# Patient Record
Sex: FEMALE | Race: BLACK OR AFRICAN AMERICAN | NOT HISPANIC OR LATINO
[De-identification: names, ages, dates, MRNs, and addresses within clinical notes are randomized per-mention and may not be internally consistent; named-entity substitution may affect disease eponyms.]

---

## 2019-08-12 PROBLEM — Z00.00 ENCOUNTER FOR PREVENTIVE HEALTH EXAMINATION: Status: ACTIVE | Noted: 2019-08-12

## 2019-09-04 ENCOUNTER — APPOINTMENT (OUTPATIENT)
Dept: BARIATRICS | Facility: CLINIC | Age: 44
End: 2019-09-04
Payer: COMMERCIAL

## 2019-09-04 VITALS
OXYGEN SATURATION: 98 % | BODY MASS INDEX: 37.51 KG/M2 | SYSTOLIC BLOOD PRESSURE: 116 MMHG | WEIGHT: 233.38 LBS | TEMPERATURE: 98.1 F | DIASTOLIC BLOOD PRESSURE: 77 MMHG | HEART RATE: 71 BPM | HEIGHT: 66 IN

## 2019-09-04 DIAGNOSIS — E78.00 PURE HYPERCHOLESTEROLEMIA, UNSPECIFIED: ICD-10-CM

## 2019-09-04 DIAGNOSIS — Z87.891 PERSONAL HISTORY OF NICOTINE DEPENDENCE: ICD-10-CM

## 2019-09-04 DIAGNOSIS — E66.01 MORBID (SEVERE) OBESITY DUE TO EXCESS CALORIES: ICD-10-CM

## 2019-09-04 DIAGNOSIS — Z78.9 OTHER SPECIFIED HEALTH STATUS: ICD-10-CM

## 2019-09-04 DIAGNOSIS — J45.909 UNSPECIFIED ASTHMA, UNCOMPLICATED: ICD-10-CM

## 2019-09-04 DIAGNOSIS — R32 UNSPECIFIED URINARY INCONTINENCE: ICD-10-CM

## 2019-09-04 DIAGNOSIS — Z83.3 FAMILY HISTORY OF DIABETES MELLITUS: ICD-10-CM

## 2019-09-04 DIAGNOSIS — Z82.5 FAMILY HISTORY OF ASTHMA AND OTHER CHRONIC LOWER RESPIRATORY DISEASES: ICD-10-CM

## 2019-09-04 DIAGNOSIS — N39.3 STRESS INCONTINENCE (FEMALE) (MALE): ICD-10-CM

## 2019-09-04 DIAGNOSIS — M54.40 LUMBAGO WITH SCIATICA, UNSPECIFIED SIDE: ICD-10-CM

## 2019-09-04 DIAGNOSIS — M54.5 LOW BACK PAIN: ICD-10-CM

## 2019-09-04 DIAGNOSIS — Z82.49 FAMILY HISTORY OF ISCHEMIC HEART DISEASE AND OTHER DISEASES OF THE CIRCULATORY SYSTEM: ICD-10-CM

## 2019-09-04 DIAGNOSIS — M25.559 PAIN IN UNSPECIFIED HIP: ICD-10-CM

## 2019-09-04 DIAGNOSIS — M25.569 PAIN IN UNSPECIFIED KNEE: ICD-10-CM

## 2019-09-04 PROCEDURE — 99203 OFFICE O/P NEW LOW 30 MIN: CPT

## 2019-09-04 RX ORDER — CETIRIZINE HYDROCHLORIDE 5 MG/1
TABLET ORAL
Refills: 0 | Status: ACTIVE | COMMUNITY

## 2019-09-04 RX ORDER — ALBUTEROL 90 MCG
AEROSOL (GRAM) INHALATION
Refills: 0 | Status: ACTIVE | COMMUNITY

## 2019-09-04 RX ORDER — FLUTICASONE PROPIONATE 50 UG/1
50 SPRAY, METERED NASAL
Refills: 0 | Status: ACTIVE | COMMUNITY

## 2019-09-26 NOTE — ASSESSMENT
[FreeTextEntry1] : She meets the NIH criteria for bariatric surgery and would like to have a laparoscopic sleeve gastrectomy. i have reviewed the risks and benefits of the procedure with the patient, and she understands this information fully.

## 2019-09-26 NOTE — HISTORY OF PRESENT ILLNESS
[de-identified] : Patient is a 44 year old female with along history of morbid obesity not responsive to multiple dietary regimens. She has a BMI of 37.7 and weight-related comorbidities including hypertension, asthma, hypercholesterolemia, stress urinary incontinence and lower back pain.

## 2019-10-02 ENCOUNTER — APPOINTMENT (OUTPATIENT)
Dept: BARIATRICS | Facility: CLINIC | Age: 44
End: 2019-10-02

## 2019-10-16 ENCOUNTER — APPOINTMENT (OUTPATIENT)
Dept: BARIATRICS | Facility: CLINIC | Age: 44
End: 2019-10-16

## 2019-11-20 ENCOUNTER — EMERGENCY (EMERGENCY)
Facility: HOSPITAL | Age: 44
LOS: 1 days | Discharge: HOME | End: 2019-11-20
Attending: EMERGENCY MEDICINE | Admitting: EMERGENCY MEDICINE
Payer: COMMERCIAL

## 2019-11-20 VITALS
SYSTOLIC BLOOD PRESSURE: 127 MMHG | TEMPERATURE: 98 F | WEIGHT: 225.09 LBS | HEART RATE: 65 BPM | RESPIRATION RATE: 16 BRPM | DIASTOLIC BLOOD PRESSURE: 79 MMHG | HEIGHT: 66 IN | OXYGEN SATURATION: 99 %

## 2019-11-20 VITALS
HEART RATE: 88 BPM | TEMPERATURE: 98 F | RESPIRATION RATE: 15 BRPM | SYSTOLIC BLOOD PRESSURE: 110 MMHG | DIASTOLIC BLOOD PRESSURE: 79 MMHG | OXYGEN SATURATION: 100 %

## 2019-11-20 LAB
ALBUMIN SERPL ELPH-MCNC: 4.5 G/DL — SIGNIFICANT CHANGE UP (ref 3.3–5)
ALP SERPL-CCNC: 98 U/L — SIGNIFICANT CHANGE UP (ref 40–120)
ALT FLD-CCNC: 12 U/L — SIGNIFICANT CHANGE UP (ref 10–45)
ANION GAP SERPL CALC-SCNC: 10 MMOL/L — SIGNIFICANT CHANGE UP (ref 5–17)
APPEARANCE UR: CLEAR — SIGNIFICANT CHANGE UP
AST SERPL-CCNC: 16 U/L — SIGNIFICANT CHANGE UP (ref 10–40)
BASOPHILS # BLD AUTO: 0.04 K/UL — SIGNIFICANT CHANGE UP (ref 0–0.2)
BASOPHILS NFR BLD AUTO: 0.8 % — SIGNIFICANT CHANGE UP (ref 0–2)
BILIRUB SERPL-MCNC: 0.4 MG/DL — SIGNIFICANT CHANGE UP (ref 0.2–1.2)
BILIRUB UR-MCNC: NEGATIVE — SIGNIFICANT CHANGE UP
BUN SERPL-MCNC: 8 MG/DL — SIGNIFICANT CHANGE UP (ref 7–23)
CALCIUM SERPL-MCNC: 10 MG/DL — SIGNIFICANT CHANGE UP (ref 8.4–10.5)
CHLORIDE SERPL-SCNC: 104 MMOL/L — SIGNIFICANT CHANGE UP (ref 96–108)
CO2 SERPL-SCNC: 27 MMOL/L — SIGNIFICANT CHANGE UP (ref 22–31)
COLOR SPEC: YELLOW — SIGNIFICANT CHANGE UP
CREAT SERPL-MCNC: 0.98 MG/DL — SIGNIFICANT CHANGE UP (ref 0.5–1.3)
DIFF PNL FLD: NEGATIVE — SIGNIFICANT CHANGE UP
EOSINOPHIL # BLD AUTO: 0.13 K/UL — SIGNIFICANT CHANGE UP (ref 0–0.5)
EOSINOPHIL NFR BLD AUTO: 2.6 % — SIGNIFICANT CHANGE UP (ref 0–6)
GLUCOSE SERPL-MCNC: 94 MG/DL — SIGNIFICANT CHANGE UP (ref 70–99)
GLUCOSE UR QL: NEGATIVE — SIGNIFICANT CHANGE UP
HCT VFR BLD CALC: 44.9 % — SIGNIFICANT CHANGE UP (ref 34.5–45)
HGB BLD-MCNC: 13.8 G/DL — SIGNIFICANT CHANGE UP (ref 11.5–15.5)
IMM GRANULOCYTES NFR BLD AUTO: 0.2 % — SIGNIFICANT CHANGE UP (ref 0–1.5)
KETONES UR-MCNC: NEGATIVE — SIGNIFICANT CHANGE UP
LEUKOCYTE ESTERASE UR-ACNC: NEGATIVE — SIGNIFICANT CHANGE UP
LIDOCAIN IGE QN: 29 U/L — SIGNIFICANT CHANGE UP (ref 7–60)
LYMPHOCYTES # BLD AUTO: 2.52 K/UL — SIGNIFICANT CHANGE UP (ref 1–3.3)
LYMPHOCYTES # BLD AUTO: 50.4 % — HIGH (ref 13–44)
MCHC RBC-ENTMCNC: 27.3 PG — SIGNIFICANT CHANGE UP (ref 27–34)
MCHC RBC-ENTMCNC: 30.7 GM/DL — LOW (ref 32–36)
MCV RBC AUTO: 88.9 FL — SIGNIFICANT CHANGE UP (ref 80–100)
MONOCYTES # BLD AUTO: 0.35 K/UL — SIGNIFICANT CHANGE UP (ref 0–0.9)
MONOCYTES NFR BLD AUTO: 7 % — SIGNIFICANT CHANGE UP (ref 2–14)
NEUTROPHILS # BLD AUTO: 1.95 K/UL — SIGNIFICANT CHANGE UP (ref 1.8–7.4)
NEUTROPHILS NFR BLD AUTO: 39 % — LOW (ref 43–77)
NITRITE UR-MCNC: NEGATIVE — SIGNIFICANT CHANGE UP
NRBC # BLD: 0 /100 WBCS — SIGNIFICANT CHANGE UP (ref 0–0)
PH UR: 6 — SIGNIFICANT CHANGE UP (ref 5–8)
PLATELET # BLD AUTO: 370 K/UL — SIGNIFICANT CHANGE UP (ref 150–400)
POTASSIUM SERPL-MCNC: 4.3 MMOL/L — SIGNIFICANT CHANGE UP (ref 3.5–5.3)
POTASSIUM SERPL-SCNC: 4.3 MMOL/L — SIGNIFICANT CHANGE UP (ref 3.5–5.3)
PROT SERPL-MCNC: 7.5 G/DL — SIGNIFICANT CHANGE UP (ref 6–8.3)
PROT UR-MCNC: NEGATIVE MG/DL — SIGNIFICANT CHANGE UP
RBC # BLD: 5.05 M/UL — SIGNIFICANT CHANGE UP (ref 3.8–5.2)
RBC # FLD: 12.3 % — SIGNIFICANT CHANGE UP (ref 10.3–14.5)
SODIUM SERPL-SCNC: 141 MMOL/L — SIGNIFICANT CHANGE UP (ref 135–145)
SP GR SPEC: >=1.03 — SIGNIFICANT CHANGE UP (ref 1–1.03)
UROBILINOGEN FLD QL: 0.2 E.U./DL — SIGNIFICANT CHANGE UP
WBC # BLD: 5 K/UL — SIGNIFICANT CHANGE UP (ref 3.8–10.5)
WBC # FLD AUTO: 5 K/UL — SIGNIFICANT CHANGE UP (ref 3.8–10.5)

## 2019-11-20 PROCEDURE — 96374 THER/PROPH/DIAG INJ IV PUSH: CPT | Mod: XU

## 2019-11-20 PROCEDURE — 85025 COMPLETE CBC W/AUTO DIFF WBC: CPT

## 2019-11-20 PROCEDURE — 83690 ASSAY OF LIPASE: CPT

## 2019-11-20 PROCEDURE — 81003 URINALYSIS AUTO W/O SCOPE: CPT

## 2019-11-20 PROCEDURE — 99284 EMERGENCY DEPT VISIT MOD MDM: CPT

## 2019-11-20 PROCEDURE — 80053 COMPREHEN METABOLIC PANEL: CPT

## 2019-11-20 PROCEDURE — 74177 CT ABD & PELVIS W/CONTRAST: CPT | Mod: 26

## 2019-11-20 PROCEDURE — 74177 CT ABD & PELVIS W/CONTRAST: CPT

## 2019-11-20 PROCEDURE — 36415 COLL VENOUS BLD VENIPUNCTURE: CPT

## 2019-11-20 PROCEDURE — 99284 EMERGENCY DEPT VISIT MOD MDM: CPT | Mod: 25

## 2019-11-20 RX ORDER — ACETAMINOPHEN 500 MG
650 TABLET ORAL ONCE
Refills: 0 | Status: COMPLETED | OUTPATIENT
Start: 2019-11-20 | End: 2019-11-20

## 2019-11-20 RX ORDER — SODIUM CHLORIDE 9 MG/ML
1000 INJECTION INTRAMUSCULAR; INTRAVENOUS; SUBCUTANEOUS ONCE
Refills: 0 | Status: COMPLETED | OUTPATIENT
Start: 2019-11-20 | End: 2019-11-20

## 2019-11-20 RX ORDER — ONDANSETRON 8 MG/1
4 TABLET, FILM COATED ORAL ONCE
Refills: 0 | Status: COMPLETED | OUTPATIENT
Start: 2019-11-20 | End: 2019-11-20

## 2019-11-20 RX ORDER — IOHEXOL 300 MG/ML
30 INJECTION, SOLUTION INTRAVENOUS ONCE
Refills: 0 | Status: COMPLETED | OUTPATIENT
Start: 2019-11-20 | End: 2019-11-20

## 2019-11-20 RX ADMIN — SODIUM CHLORIDE 1000 MILLILITER(S): 9 INJECTION INTRAMUSCULAR; INTRAVENOUS; SUBCUTANEOUS at 12:32

## 2019-11-20 RX ADMIN — IOHEXOL 30 MILLILITER(S): 300 INJECTION, SOLUTION INTRAVENOUS at 12:32

## 2019-11-20 RX ADMIN — Medication 650 MILLIGRAM(S): at 12:32

## 2019-11-20 RX ADMIN — ONDANSETRON 4 MILLIGRAM(S): 8 TABLET, FILM COATED ORAL at 13:19

## 2019-11-20 NOTE — ED PROVIDER NOTE - CLINICAL SUMMARY MEDICAL DECISION MAKING FREE TEXT BOX
43 y/o F with a PMHx of supraumbilical hernia who is able to reduce by herself presets with 3 days of worsening abdominal pain. Pt states she is able to reduce the hernia with increased pain. She also reports some associated nausea. On exam, pt appears well and nontoxic with some discomfort to palpation superior to the umbilicus. No palpable hernia. Plan is labs, meds, and CT. 45 y/o F with a PMHx of supraumbilical hernia who is able to reduce by herself presets with 3 days of worsening abdominal pain. Pt states she is able to reduce the hernia with increased pain. She also reports some associated nausea. On exam, pt appears well and nontoxic with some discomfort to palpation superior to the umbilicus. No palpable hernia. Plan is labs, meds, and CT. us shows abdominal hernia, no incarceration, plan to follow up with surgery. pt has appointment on Dec 6 Dr. Bettencourt. 45 y/o F with a PMHx of supraumbilical hernia who is able to reduce by herself presets with 3 days of worsening abdominal pain. Pt states she is able to reduce the hernia with increased pain. She also reports some associated nausea. On exam, pt appears well and nontoxic with some discomfort to palpation superior to the umbilicus. No palpable hernia. Plan is labs, meds, and CT. us shows abdominal hernia, no incarceration, plan to follow up with surgery. pt has appointment on Dec 6 Dr. Bettencourt. ED evaluation and management discussed with the patient and family (if available) in detail.  Close PMD follow up encouraged.  Strict ED return instructions discussed in detail and patient given the opportunity to ask any questions about their discharge diagnosis and instructions. Patient verbalized understanding. Patient is agreeable to plan.

## 2019-11-20 NOTE — ED PROVIDER NOTE - ATTENDING CONTRIBUTION TO CARE
44F recently dx reducible supraumbilical hernia c/o 3d focal achy pain located at known umbilical hernia site. no fever/chills, no n/v, no diarrhea/constipation, no hematochezia/melena, no rash. avss. nontoxic. NAD. no e/o sepsis. LFTs wnl. found to have fat-containing supraumbilical hernia on ct a/p. pain controlled. tolerating po. will dc home, pt already w/ outpatient surgery fu, ibuprofen prn, strict return precautions. pt/family agrees w/ plan. questions answered.     I saw and discussed the care of the pt directly with the ACP while the pt was in the ED. i have reviewed the ACP note and agree w/ the history, exam and plan of care other than as noted above.

## 2019-11-20 NOTE — ED PROVIDER NOTE - NSFOLLOWUPINSTRUCTIONS_ED_ALL_ED_FT
please follow up with Dr. Batista at your appointment    please come back to er for any worsening of symptoms    Image   A hernia happens when tissue inside your body pushes out through a weak spot in your belly muscles (abdominal wall). This makes a round lump (bulge). The lump may be:  In a scar from surgery that was done in your belly (incisional hernia).Near your belly button (umbilical hernia).In your groin (inguinal hernia). Your groin is the area where your leg meets your lower belly (abdomen). This kind of hernia could also be:  In your scrotum, if you are male.In folds of skin around your vagina, if you are female.In your upper thigh (femoral hernia).Inside your belly (hiatal hernia). This happens when your stomach slides above the muscle between your belly and your chest (diaphragm).If your hernia is small and it does not cause pain, you may not need treatment. If your hernia is large or it causes pain, you may need surgery.  Follow these instructions at home:  Activity     Avoid stretching or overusing (straining) the muscles near your hernia. Straining can happen when you:  Lift something heavy.Poop (have a bowel movement).Do not lift anything that is heavier than 10 lb (4.5 kg), or the limit that you are told, until your doctor says that it is safe.Use the strength of your legs when you lift something heavy. Do not use only your back muscles to lift.General instructions     Do these things if told by your doctor so you do not have trouble pooping (constipation):  Drink enough fluid to keep your pee (urine) pale yellow.Eat foods that are high in fiber. These include fresh fruits and vegetables, whole grains, and beans.Limit foods that are high in fat and processed sugars. These include foods that are fried or sweet.Take medicine for trouble pooping.When you cough, try to cough gently.You may try to push your hernia in by very gently pressing on it when you are lying down. Do not try to force the bulge back in if it will not push in easily.If you are overweight, work with your doctor to lose weight safely.Do not use any products that have nicotine or tobacco in them. These include cigarettes and e-cigarettes. If you need help quitting, ask your doctor.If you will be having surgery (hernia repair), watch your hernia for changes in shape, size, or color. Tell your doctor if you see any changes.Take over-the-counter and prescription medicines only as told by your doctor.Keep all follow-up visits as told by your doctor.Contact a doctor if:  You get new pain, swelling, or redness near your hernia.You poop fewer times in a week than normal.You have trouble pooping.You have poop (stool) that is more dry than normal.You have poop that is harder or larger than normal.Get help right away if:  You have a fever.You have belly pain that gets worse.You feel sick to your stomach (nauseous).You throw up (vomit).Your hernia cannot be pushed in by very gently pressing on it when you are lying down. Do not try to force the bulge back in if it will not push in easily.Your hernia:  Changes in shape or size.Changes color.Feels hard or it hurts when you touch it.These symptoms may represent a serious problem that is an emergency. Do not wait to see if the symptoms will go away. Get medical help right away. Call your local emergency services (911 in the [place]   Summary  A hernia happens when tissue inside your body pushes out through a weak spot in the belly muscles. This creates a bulge.If your hernia is small and it does not hurt, you may not need treatment. If your hernia is large or it hurts, you may need surgery.If you will be having surgery, watch your hernia for changes in shape, size, or color. Tell your doctor about any changes.This information is not intended to replace advice given to you by your health care provider. Make sure you discuss any questions you have with your health care provider.    Document Released: 06/07/2011 Document Revised: 09/19/2018 Document Reviewed: 09/19/2018  Elsevier Interactive Patient Education © 2019 [employer]

## 2019-11-20 NOTE — ED ADULT TRIAGE NOTE - OTHER COMPLAINTS
Pt presents with complaints of intermittent nausea today and mid abdominal pain 8/10 x2 months worsening today. Pt states "I have a known hernia and I am supposed to see a surgeon but I have not yet and the pain is worse today". Pt denies any fevers, no lightheadedness, no dizziness, no cp, no sob, no vomiting, no urinary complaints, no bowel complaints. PMH Depression and Asthma

## 2019-11-20 NOTE — ED PROVIDER NOTE - PATIENT PORTAL LINK FT
You can access the FollowMyHealth Patient Portal offered by [facility] by registering at the following website: [email or web address]. By joining [employer]’s FollowMyHealth portal, you will also be able to view your health information using other applications (apps) compatible with our system.

## 2019-11-20 NOTE — ED PROVIDER NOTE - OBJECTIVE STATEMENT
43 y/o F with a PMHx of supraumbilical hernia who is able to reduce it by herself presents with 3 days of worsening abdominal pain. Pt states she is able to reduce the hernia but when she does, she has increase pain. She first developed this hernia 2 months ago when she was doing a "plank" abdominal exercise. The hernia now pops out whenever she contracts her muscles, has gas, and 30minutes after eating. She also reports some associated nausea secondary to the abdominal pain. Pt denies fevers, chills, allergies, CP, heart palpitations, cough, and SOB. 45 y/o F with a PMHx of supraumbilical hernia who is able to reduce it by herself presents with 3 days of worsening abdominal pain. Pt states she is able to reduce the hernia but when she does, she has increase pain. She first developed this hernia 2 months ago when she was doing a "plank" abdominal exercise. The hernia now pops out whenever she contracts her muscles, has gas, and 30minutes after eating. She also reports some associated nausea secondary to the abdominal pain. Pt denies fevers, chills, allergies, CP, heart palpitations, cough, and SOB. states that she has an appointment in December with Dr. Bettencourt.

## 2019-11-20 NOTE — ED ADULT NURSE NOTE - OBJECTIVE STATEMENT
44y F, A&ox3, presents to ED c/o abd pain worsening past few days. Reports hx of umbilical hernia and states "it hurts" Not palpable hernia on examination. Pt verbalized mild nausea. no fevers nor chills. no changes in bowels, no urinary s/s. No cp, no sob, Lungs clear bilateral.

## 2019-11-20 NOTE — ED PROVIDER NOTE - CARE PLAN
Principal Discharge DX:	Abdominal hernia without obstruction and without gangrene, recurrence not specified, unspecified hernia type

## 2019-11-20 NOTE — ED PROVIDER NOTE - PHYSICAL EXAMINATION
General: Patient is well developed and well nourised. Patient is alert and oriented to person, place and date. Patient is laying comfortably in stretcher and appears in no acute distress.  HEENT: Head is normocephalic and atraumatic. Pupils are equal, round and reactive. Extraocular movements intact. No evidence of nystagmus, conjunctival injection, or scleral icterus. External ears symmetric without evidence of discharge.  Nose is symmetric, non-tender, patent without evidence of discharge. Teeth in good repair. Uvula midline.   Neck: Supple with no evidence of lymphadenopathy.  Full range of motion.  Heart: Regular rate and rhythm. No murmurs, rubs or gallops.   Lungs: Clear to auscultation bilaterally with equal chest expansion. No note of wheezes, rhonchi, rales. Equal chest expansion. No note of retractions.  Abdomen: ttp supraumbilical region. Bowel sounds present in all four quadrants. Soft, non-tender, non-distended without signs of masses, rebound or guarding. No note of hepatosplenomegaly. No CVA tenderness bilaterally. Negative Ventura sign. No pain present over McBurney's point.  Neuro: GCS 15. Moving all extremities without discomfort. Strength is 5/5 arms and legs bilaterally. Sensation intact in all four extremities. gait steady   Skin: Warm, dry and intact without evidence of rashes, bruising, pallor, jaundice or cyanosis.   Psych: Mood and affect appropriate.

## 2019-11-25 DIAGNOSIS — K46.9 UNSPECIFIED ABDOMINAL HERNIA WITHOUT OBSTRUCTION OR GANGRENE: ICD-10-CM

## 2019-11-25 DIAGNOSIS — R10.9 UNSPECIFIED ABDOMINAL PAIN: ICD-10-CM

## 2020-01-22 ENCOUNTER — RESULT REVIEW (OUTPATIENT)
Age: 45
End: 2020-01-22

## 2020-01-22 ENCOUNTER — INPATIENT (INPATIENT)
Facility: HOSPITAL | Age: 45
LOS: 2 days | Discharge: HOME | DRG: 355 | End: 2020-01-25
Attending: SURGERY | Admitting: SURGERY
Payer: COMMERCIAL

## 2020-01-22 VITALS
TEMPERATURE: 98 F | HEART RATE: 66 BPM | SYSTOLIC BLOOD PRESSURE: 113 MMHG | DIASTOLIC BLOOD PRESSURE: 76 MMHG | WEIGHT: 228.62 LBS | HEIGHT: 67 IN | RESPIRATION RATE: 16 BRPM | OXYGEN SATURATION: 97 %

## 2020-01-22 DIAGNOSIS — Z98.51 TUBAL LIGATION STATUS: Chronic | ICD-10-CM

## 2020-01-22 DIAGNOSIS — Z98.890 OTHER SPECIFIED POSTPROCEDURAL STATES: Chronic | ICD-10-CM

## 2020-01-22 PROCEDURE — 88302 TISSUE EXAM BY PATHOLOGIST: CPT | Mod: 26

## 2020-01-22 RX ORDER — BUPIVACAINE 13.3 MG/ML
20 INJECTION, SUSPENSION, LIPOSOMAL INFILTRATION ONCE
Refills: 0 | Status: DISCONTINUED | OUTPATIENT
Start: 2020-01-22 | End: 2020-01-25

## 2020-01-22 RX ORDER — ESCITALOPRAM OXALATE 10 MG/1
1 TABLET, FILM COATED ORAL
Qty: 0 | Refills: 0 | DISCHARGE

## 2020-01-22 RX ORDER — CONJUGATED ESTROGENS/BAZEDOXIFENE .45; 2 MG/1; MG/1
1 TABLET, FILM COATED ORAL
Qty: 0 | Refills: 0 | DISCHARGE

## 2020-01-22 RX ORDER — ONDANSETRON 8 MG/1
4 TABLET, FILM COATED ORAL EVERY 6 HOURS
Refills: 0 | Status: DISCONTINUED | OUTPATIENT
Start: 2020-01-22 | End: 2020-01-25

## 2020-01-22 RX ORDER — SODIUM CHLORIDE 9 MG/ML
1000 INJECTION, SOLUTION INTRAVENOUS
Refills: 0 | Status: DISCONTINUED | OUTPATIENT
Start: 2020-01-22 | End: 2020-01-25

## 2020-01-22 RX ORDER — KETOROLAC TROMETHAMINE 30 MG/ML
30 SYRINGE (ML) INJECTION EVERY 6 HOURS
Refills: 0 | Status: DISCONTINUED | OUTPATIENT
Start: 2020-01-22 | End: 2020-01-23

## 2020-01-22 RX ORDER — ACETAMINOPHEN 500 MG
1000 TABLET ORAL ONCE
Refills: 0 | Status: COMPLETED | OUTPATIENT
Start: 2020-01-22 | End: 2020-01-22

## 2020-01-22 RX ADMIN — Medication 30 MILLIGRAM(S): at 17:26

## 2020-01-22 RX ADMIN — Medication 400 MILLIGRAM(S): at 16:46

## 2020-01-22 RX ADMIN — Medication 30 MILLIGRAM(S): at 12:52

## 2020-01-22 RX ADMIN — Medication 30 MILLIGRAM(S): at 17:33

## 2020-01-22 RX ADMIN — Medication 1000 MILLIGRAM(S): at 17:46

## 2020-01-22 NOTE — PROGRESS NOTE ADULT - SUBJECTIVE AND OBJECTIVE BOX
POST-OPERATIVE NOTE    Procedure: ventral hernia repair    Diagnosis/Indication: ventral hernia    Surgeon: Dr. Bettencourt    S: Pt has no complaints. Denies CP, SOB, SUAZO, calf tenderness. Pain controlled with medication. Denies nausea, vomiting.    O:  T(C): 37 (01-22-20 @ 12:46), Max: 37.9 (01-22-20 @ 11:39)  T(F): 98.6 (01-22-20 @ 12:46), Max: 100.2 (01-22-20 @ 11:39)  HR: 72 (01-22-20 @ 12:46) (70 - 84)  BP: 132/87 (01-22-20 @ 12:46) (128/82 - 166/89)  RR: 17 (01-22-20 @ 12:46) (12 - 18)  SpO2: 98% (01-22-20 @ 12:46) (98% - 100%)  Wt(kg): --            Gen: NAD, resting comfortably in bed  C/V: NSR  Pulm: Nonlabored breathing, no respiratory distress  Abd: soft, NT/ND, midline prevena vac in place.  : Fernandez  Extrem: WWP, no calf edema or tenderness, SCDs in place

## 2020-01-22 NOTE — H&P ADULT - NSHPPHYSICALEXAM_GEN_ALL_CORE
Vital Signs Last 24 Hrs  T(C): 36.4 (22 Jan 2020 07:23), Max: 36.4 (22 Jan 2020 07:23)  T(F): 97.5 (22 Jan 2020 07:23), Max: 97.5 (22 Jan 2020 07:23)  HR: 80 (22 Jan 2020 11:39) (66 - 80)  BP: 137/84 (22 Jan 2020 11:39) (113/76 - 166/89)  BP(mean): 104 (22 Jan 2020 11:39) (101 - 117)  RR: 13 (22 Jan 2020 11:39) (12 - 18)  SpO2: 100% (22 Jan 2020 11:39) (97% - 100%)    Physical Exam:  General: NAD, resting comfortably in bed  Pulmonary: Nonlabored breathing, no respiratory distress  Abdominal: soft, nondistended, nontender with no rebound or guarding  Extremities: WWP  Neuro: A/O x 3

## 2020-01-22 NOTE — PROGRESS NOTE ADULT - ASSESSMENT
A/P: 44y Female s/p above procedure  Diet: NPO  IVF: LR  Pain/nausea control  SQH/SCDs/OOBA/IS  Dispo pending pain control, PO tolerance, clinical improvement

## 2020-01-22 NOTE — H&P ADULT - HISTORY OF PRESENT ILLNESS
44F with pmhx of asthma, depression and pshx of laparoscopic R ovarian torsion repair (1999), laparotomy repair for Rt. ruptured ectopic pregnancy (2002), b/l tubal ligation (2008) presents for elective ventral hernia repair. Pt. states that roughly 3 months ago, while at her gym attempting a plank maneuver, she felt a tear in her abdomen, feeling "something popped and parts of her stomach came out." Pt. noted she was initially able to push her hernia back in, but she experienced sharp, intense pain when trying to reduce her hernia. Pain was stated to be exacerbated with movements that increased pressure in her abdomen, such as laughing, sneezing, and eating too much, and pt. noted only mild improvement of her pain w/ NSAID therapy. During the course of her hernia, pt. states to have been experiencing sharp pain mostly in the middle of her abdomen, which persisted throughout the day. Pain was rated to be a 10/10 in severity. Roughly one month after her symptoms began, she states she was no longer able to reduce the hernia. Pt. admits to have visited the ED at [facility] roughly one month after the onset of her symptoms for pain exacerbations, during which she was given medication for pain and IV fluid. Following this episode at the ED, pt. sought consultation by Dr. Bettencourt.  Meds: terbutaline inhaler PRN, escitalopram 10mg daily, duavee (estrogen replacement)  Allergies: NKDA

## 2020-01-22 NOTE — BRIEF OPERATIVE NOTE - OPERATION/FINDINGS
Open ventral hernia repair. Defect closed with mesh. Hemostasis achieved prior to closure. Prevena vac installed over incision.

## 2020-01-22 NOTE — H&P ADULT - ASSESSMENT
44F with pmhx of asthma, depression and pshx of laparoscopic R ovarian torsion repair (1999), laparotomy repair for Rt. ruptured ectopic pregnancy (2002), b/l tubal ligation (2008) presents for elective ventral hernia repair on 1/22.     pain/nausea control  NPO with sips/IVF  OOB/ambulate/IS   AM labs

## 2020-01-22 NOTE — ASU PREOP CHECKLIST - DENTURES
----- Message from Yoni Diaz sent at 11/6/2019  8:11 AM CST -----  Contact: King [phone]  Type: Patient Call Back    Who called:King    What is the request in detail: The patient states that the metformin was sent over, but not the hydrocodone. The patient states he is leaving out of town and would like the medication beforehand if possible    Can the clinic reply by [facility]?no    Would the patient rather a call back or a response via [facility]? Call back     Best call back number:[phone]           no

## 2020-01-23 LAB
ANION GAP SERPL CALC-SCNC: 13 MMOL/L — SIGNIFICANT CHANGE UP (ref 5–17)
BUN SERPL-MCNC: 10 MG/DL — SIGNIFICANT CHANGE UP (ref 7–23)
CALCIUM SERPL-MCNC: 9.4 MG/DL — SIGNIFICANT CHANGE UP (ref 8.4–10.5)
CHLORIDE SERPL-SCNC: 106 MMOL/L — SIGNIFICANT CHANGE UP (ref 96–108)
CO2 SERPL-SCNC: 25 MMOL/L — SIGNIFICANT CHANGE UP (ref 22–31)
CREAT SERPL-MCNC: 0.82 MG/DL — SIGNIFICANT CHANGE UP (ref 0.5–1.3)
GLUCOSE SERPL-MCNC: 107 MG/DL — HIGH (ref 70–99)
HCT VFR BLD CALC: 36.2 % — SIGNIFICANT CHANGE UP (ref 34.5–45)
HGB BLD-MCNC: 11.3 G/DL — LOW (ref 11.5–15.5)
MAGNESIUM SERPL-MCNC: 2 MG/DL — SIGNIFICANT CHANGE UP (ref 1.6–2.6)
MCHC RBC-ENTMCNC: 28 PG — SIGNIFICANT CHANGE UP (ref 27–34)
MCHC RBC-ENTMCNC: 31.2 GM/DL — LOW (ref 32–36)
MCV RBC AUTO: 89.6 FL — SIGNIFICANT CHANGE UP (ref 80–100)
NRBC # BLD: 0 /100 WBCS — SIGNIFICANT CHANGE UP (ref 0–0)
PHOSPHATE SERPL-MCNC: 4.5 MG/DL — SIGNIFICANT CHANGE UP (ref 2.5–4.5)
PLATELET # BLD AUTO: 310 K/UL — SIGNIFICANT CHANGE UP (ref 150–400)
POTASSIUM SERPL-MCNC: 4.3 MMOL/L — SIGNIFICANT CHANGE UP (ref 3.5–5.3)
POTASSIUM SERPL-SCNC: 4.3 MMOL/L — SIGNIFICANT CHANGE UP (ref 3.5–5.3)
RBC # BLD: 4.04 M/UL — SIGNIFICANT CHANGE UP (ref 3.8–5.2)
RBC # FLD: 12.7 % — SIGNIFICANT CHANGE UP (ref 10.3–14.5)
SODIUM SERPL-SCNC: 144 MMOL/L — SIGNIFICANT CHANGE UP (ref 135–145)
WBC # BLD: 10.39 K/UL — SIGNIFICANT CHANGE UP (ref 3.8–10.5)
WBC # FLD AUTO: 10.39 K/UL — SIGNIFICANT CHANGE UP (ref 3.8–10.5)

## 2020-01-23 RX ORDER — SODIUM CHLORIDE 0.65 %
1 AEROSOL, SPRAY (ML) NASAL EVERY 4 HOURS
Refills: 0 | Status: DISCONTINUED | OUTPATIENT
Start: 2020-01-23 | End: 2020-01-25

## 2020-01-23 RX ORDER — BUDESONIDE AND FORMOTEROL FUMARATE DIHYDRATE 160; 4.5 UG/1; UG/1
2 AEROSOL RESPIRATORY (INHALATION)
Refills: 0 | Status: DISCONTINUED | OUTPATIENT
Start: 2020-01-23 | End: 2020-01-23

## 2020-01-23 RX ORDER — HYDROMORPHONE HYDROCHLORIDE 2 MG/ML
30 INJECTION INTRAMUSCULAR; INTRAVENOUS; SUBCUTANEOUS
Refills: 0 | Status: DISCONTINUED | OUTPATIENT
Start: 2020-01-23 | End: 2020-01-24

## 2020-01-23 RX ORDER — HEPARIN SODIUM 5000 [USP'U]/ML
7500 INJECTION INTRAVENOUS; SUBCUTANEOUS EVERY 8 HOURS
Refills: 0 | Status: DISCONTINUED | OUTPATIENT
Start: 2020-01-23 | End: 2020-01-25

## 2020-01-23 RX ORDER — KETOROLAC TROMETHAMINE 30 MG/ML
30 SYRINGE (ML) INJECTION EVERY 6 HOURS
Refills: 0 | Status: DISCONTINUED | OUTPATIENT
Start: 2020-01-23 | End: 2020-01-24

## 2020-01-23 RX ORDER — NALOXONE HYDROCHLORIDE 4 MG/.1ML
0.1 SPRAY NASAL
Refills: 0 | Status: DISCONTINUED | OUTPATIENT
Start: 2020-01-23 | End: 2020-01-24

## 2020-01-23 RX ORDER — ACETAMINOPHEN 500 MG
1000 TABLET ORAL ONCE
Refills: 0 | Status: COMPLETED | OUTPATIENT
Start: 2020-01-23 | End: 2020-01-23

## 2020-01-23 RX ORDER — ALBUTEROL 90 UG/1
2 AEROSOL, METERED ORAL EVERY 6 HOURS
Refills: 0 | Status: DISCONTINUED | OUTPATIENT
Start: 2020-01-23 | End: 2020-01-25

## 2020-01-23 RX ORDER — DIPHENHYDRAMINE HCL 50 MG
50 CAPSULE ORAL ONCE
Refills: 0 | Status: COMPLETED | OUTPATIENT
Start: 2020-01-23 | End: 2020-01-23

## 2020-01-23 RX ORDER — DIPHENHYDRAMINE HCL 50 MG
25 CAPSULE ORAL ONCE
Refills: 0 | Status: COMPLETED | OUTPATIENT
Start: 2020-01-23 | End: 2020-01-23

## 2020-01-23 RX ORDER — ACETAMINOPHEN 500 MG
975 TABLET ORAL ONCE
Refills: 0 | Status: COMPLETED | OUTPATIENT
Start: 2020-01-23 | End: 2020-01-23

## 2020-01-23 RX ADMIN — Medication 30 MILLIGRAM(S): at 17:30

## 2020-01-23 RX ADMIN — Medication 30 MILLIGRAM(S): at 00:30

## 2020-01-23 RX ADMIN — Medication 30 MILLIGRAM(S): at 05:58

## 2020-01-23 RX ADMIN — ONDANSETRON 4 MILLIGRAM(S): 8 TABLET, FILM COATED ORAL at 10:34

## 2020-01-23 RX ADMIN — HEPARIN SODIUM 7500 UNIT(S): 5000 INJECTION INTRAVENOUS; SUBCUTANEOUS at 14:50

## 2020-01-23 RX ADMIN — ONDANSETRON 4 MILLIGRAM(S): 8 TABLET, FILM COATED ORAL at 15:36

## 2020-01-23 RX ADMIN — Medication 30 MILLIGRAM(S): at 12:11

## 2020-01-23 RX ADMIN — Medication 400 MILLIGRAM(S): at 01:14

## 2020-01-23 RX ADMIN — Medication 1000 MILLIGRAM(S): at 02:12

## 2020-01-23 RX ADMIN — SODIUM CHLORIDE 100 MILLILITER(S): 9 INJECTION, SOLUTION INTRAVENOUS at 16:12

## 2020-01-23 RX ADMIN — Medication 25 MILLIGRAM(S): at 15:36

## 2020-01-23 RX ADMIN — Medication 975 MILLIGRAM(S): at 10:20

## 2020-01-23 RX ADMIN — HEPARIN SODIUM 7500 UNIT(S): 5000 INJECTION INTRAVENOUS; SUBCUTANEOUS at 21:14

## 2020-01-23 RX ADMIN — Medication 30 MILLIGRAM(S): at 12:22

## 2020-01-23 RX ADMIN — Medication 50 MILLIGRAM(S): at 21:14

## 2020-01-23 RX ADMIN — Medication 1 SPRAY(S): at 17:18

## 2020-01-23 RX ADMIN — Medication 30 MILLIGRAM(S): at 06:10

## 2020-01-23 RX ADMIN — Medication 30 MILLIGRAM(S): at 00:07

## 2020-01-23 RX ADMIN — Medication 30 MILLIGRAM(S): at 17:17

## 2020-01-23 RX ADMIN — ALBUTEROL 2 PUFF(S): 90 AEROSOL, METERED ORAL at 11:10

## 2020-01-23 RX ADMIN — HYDROMORPHONE HYDROCHLORIDE 30 MILLILITER(S): 2 INJECTION INTRAMUSCULAR; INTRAVENOUS; SUBCUTANEOUS at 11:58

## 2020-01-23 RX ADMIN — Medication 975 MILLIGRAM(S): at 09:19

## 2020-01-23 NOTE — PROGRESS NOTE ADULT - ASSESSMENT
44F with pmhx of asthma, depression and pshx of laparoscopic R ovarian torsion repair (1999), laparotomy repair for Rt. ruptured ectopic pregnancy (2002), b/l tubal ligation (2008) presents for elective ventral hernia repair on 1/22.     adv to CLD/IVF  toradol  zofran prn  PCA  dc guevara  ice packs  SCDS/IS  AM labs

## 2020-01-23 NOTE — PROGRESS NOTE ADULT - SUBJECTIVE AND OBJECTIVE BOX
INTERVAL HPI/OVERNIGHT EVENTS:   SURGERY ATTENDING    STATUS POST:  Exploratory Laparotomy, THEODORE, Repair of incisional / ventral hernia with myofascial flaps B/L and mesh, placement of PREVENA VAC     POST OPERATIVE DAY #: 1    SUBJECTIVE:  Flatus: [x ] YES [ ] NO             Bowel Movement: [ ] YES [x ] NO  Pain (0-10):       3     Pain Control Adequate: [x ] YES [ ] NO  Nausea: [ ] YES [x ] NO            Vomiting: [ ] YES [x ] NO  Diarrhea: [ ] YES [x ] NO         Constipation: [ ] YES [x ] NO     Chest Pain: [ ] YES [x ] NO    SOB:  [ ] YES [x ] NO    MEDICATIONS  (STANDING):  BUpivacaine liposome 1.3% Injectable (no eMAR) 20 milliLiter(s) Local Injection once  heparin  Injectable 5000 Unit(s) SubCutaneous every 8 hours  HYDROmorphone PCA (1 mG/mL) 30 milliLiter(s) PCA Continuous PCA Continuous  ketorolac   Injectable 30 milliGRAM(s) IV Push every 6 hours  lactated ringers. 1000 milliLiter(s) (100 mL/Hr) IV Continuous <Continuous>    MEDICATIONS  (PRN):  ALBUTerol    90 MICROgram(s) HFA Inhaler 2 Puff(s) Inhalation every 6 hours PRN Shortness of Breath and/or Wheezing  naloxone Injectable 0.1 milliGRAM(s) IV Push every 3 minutes PRN For ANY of the following changes in patient status:  A. RR LESS THAN 10 breaths per minute, B. Oxygen saturation LESS THAN 90%, C. Sedation score of 6  ondansetron Injectable 4 milliGRAM(s) IV Push every 6 hours PRN Nausea      Vital Signs Last 24 Hrs  T(C): 36.7 (23 Jan 2020 08:34), Max: 37.5 (22 Jan 2020 12:20)  T(F): 98 (23 Jan 2020 08:34), Max: 99.5 (22 Jan 2020 12:20)  HR: 64 (23 Jan 2020 08:34) (60 - 84)  BP: 135/85 (23 Jan 2020 08:34) (128/82 - 143/84)  BP(mean): 99 (22 Jan 2020 11:54) (99 - 99)  RR: 16 (23 Jan 2020 08:34) (12 - 18)  SpO2: 99% (23 Jan 2020 08:34) (96% - 100%)    PHYSICAL EXAM:      Constitutional:    Eyes:    ENMT:    Neck:    Breasts:    Back:    Respiratory:    Cardiovascular:    Gastrointestinal:    Genitourinary:    Rectal:    Extremities:    Vascular:    Neurological:    Skin:    Lymph Nodes:    Musculoskeletal:    Psychiatric:        I&O's Detail    22 Jan 2020 07:01  -  23 Jan 2020 07:00  --------------------------------------------------------  IN:    IV PiggyBack: 100 mL    lactated ringers.: 1300 mL    Oral Fluid: 50 mL  Total IN: 1450 mL    OUT:    Indwelling Catheter - Urethral: 2010 mL    Voided: 325 mL  Total OUT: 2335 mL    Total NET: -885 mL      23 Jan 2020 07:01  -  23 Jan 2020 11:48  --------------------------------------------------------  IN:    lactated ringers.: 200 mL    Oral Fluid: 360 mL  Total IN: 560 mL    OUT:    Voided: 150 mL  Total OUT: 150 mL    Total NET: 410 mL          LABS:                        11.3   10.39 )-----------( 310      ( 23 Jan 2020 06:29 )             36.2     01-23    144  |  106  |  10  ----------------------------<  107<H>  4.3   |  25  |  0.82    Ca    9.4      23 Jan 2020 06:29  Phos  4.5     01-23  Mg     2.0     01-23            RADIOLOGY & ADDITIONAL STUDIES:

## 2020-01-23 NOTE — PROGRESS NOTE ADULT - SUBJECTIVE AND OBJECTIVE BOX
24 hr events:  O/N: +F, no BM, pain ctrl, OOBA  1/22: s/p VHR, POC wnl    SUBJECTIVE:  Pt seen and examined by chief resident. Pt is doing well, resting comfortably on bed. mild pain. Diet tolerated. Ambulating out of bed. +F/-BM. No nausea or vomiting. No complaints at this time.    Vital Signs Last 24 Hrs  T(C): 36.7 (23 Jan 2020 08:34), Max: 37 (22 Jan 2020 12:46)  T(F): 98 (23 Jan 2020 08:34), Max: 98.6 (22 Jan 2020 12:46)  HR: 64 (23 Jan 2020 08:34) (60 - 77)  BP: 135/85 (23 Jan 2020 08:34) (129/86 - 143/84)  BP(mean): --  RR: 16 (23 Jan 2020 08:34) (16 - 18)  SpO2: 99% (23 Jan 2020 08:34) (96% - 100%)    Physical Exam:  General: NAD  Pulmonary: Nonlabored breathing, no respiratory distress  Abdominal: soft, mild tenderness, nondistended, prevena vac in place.   Extremities: WWP, SCDs in place    I&O's Summary    22 Jan 2020 07:01  -  23 Jan 2020 07:00  --------------------------------------------------------  IN: 1450 mL / OUT: 2335 mL / NET: -885 mL    23 Jan 2020 07:01  -  23 Jan 2020 12:39  --------------------------------------------------------  IN: 560 mL / OUT: 150 mL / NET: 410 mL        LABS:                        11.3   10.39 )-----------( 310      ( 23 Jan 2020 06:29 )             36.2     01-23    144  |  106  |  10  ----------------------------<  107<H>  4.3   |  25  |  0.82    Ca    9.4      23 Jan 2020 06:29  Phos  4.5     01-23  Mg     2.0     01-23

## 2020-01-24 LAB
ANION GAP SERPL CALC-SCNC: 9 MMOL/L — SIGNIFICANT CHANGE UP (ref 5–17)
BUN SERPL-MCNC: 10 MG/DL — SIGNIFICANT CHANGE UP (ref 7–23)
CALCIUM SERPL-MCNC: 9.4 MG/DL — SIGNIFICANT CHANGE UP (ref 8.4–10.5)
CHLORIDE SERPL-SCNC: 104 MMOL/L — SIGNIFICANT CHANGE UP (ref 96–108)
CO2 SERPL-SCNC: 28 MMOL/L — SIGNIFICANT CHANGE UP (ref 22–31)
CREAT SERPL-MCNC: 1 MG/DL — SIGNIFICANT CHANGE UP (ref 0.5–1.3)
GLUCOSE SERPL-MCNC: 94 MG/DL — SIGNIFICANT CHANGE UP (ref 70–99)
HCT VFR BLD CALC: 38.1 % — SIGNIFICANT CHANGE UP (ref 34.5–45)
HGB BLD-MCNC: 12 G/DL — SIGNIFICANT CHANGE UP (ref 11.5–15.5)
MAGNESIUM SERPL-MCNC: 2 MG/DL — SIGNIFICANT CHANGE UP (ref 1.6–2.6)
MCHC RBC-ENTMCNC: 28.5 PG — SIGNIFICANT CHANGE UP (ref 27–34)
MCHC RBC-ENTMCNC: 31.5 GM/DL — LOW (ref 32–36)
MCV RBC AUTO: 90.5 FL — SIGNIFICANT CHANGE UP (ref 80–100)
NRBC # BLD: 0 /100 WBCS — SIGNIFICANT CHANGE UP (ref 0–0)
PHOSPHATE SERPL-MCNC: 3.7 MG/DL — SIGNIFICANT CHANGE UP (ref 2.5–4.5)
PLATELET # BLD AUTO: 317 K/UL — SIGNIFICANT CHANGE UP (ref 150–400)
POTASSIUM SERPL-MCNC: 4.8 MMOL/L — SIGNIFICANT CHANGE UP (ref 3.5–5.3)
POTASSIUM SERPL-SCNC: 4.8 MMOL/L — SIGNIFICANT CHANGE UP (ref 3.5–5.3)
RBC # BLD: 4.21 M/UL — SIGNIFICANT CHANGE UP (ref 3.8–5.2)
RBC # FLD: 12.9 % — SIGNIFICANT CHANGE UP (ref 10.3–14.5)
SODIUM SERPL-SCNC: 141 MMOL/L — SIGNIFICANT CHANGE UP (ref 135–145)
WBC # BLD: 9.2 K/UL — SIGNIFICANT CHANGE UP (ref 3.8–10.5)
WBC # FLD AUTO: 9.2 K/UL — SIGNIFICANT CHANGE UP (ref 3.8–10.5)

## 2020-01-24 RX ORDER — DIPHENHYDRAMINE HCL 50 MG
25 CAPSULE ORAL ONCE
Refills: 0 | Status: COMPLETED | OUTPATIENT
Start: 2020-01-24 | End: 2020-01-24

## 2020-01-24 RX ORDER — ACETAMINOPHEN 500 MG
1000 TABLET ORAL ONCE
Refills: 0 | Status: COMPLETED | OUTPATIENT
Start: 2020-01-24 | End: 2020-01-24

## 2020-01-24 RX ORDER — DIPHENHYDRAMINE HCL 50 MG
25 CAPSULE ORAL ONCE
Refills: 0 | Status: DISCONTINUED | OUTPATIENT
Start: 2020-01-24 | End: 2020-01-24

## 2020-01-24 RX ORDER — KETOROLAC TROMETHAMINE 30 MG/ML
30 SYRINGE (ML) INJECTION EVERY 6 HOURS
Refills: 0 | Status: DISCONTINUED | OUTPATIENT
Start: 2020-01-24 | End: 2020-01-25

## 2020-01-24 RX ADMIN — HEPARIN SODIUM 7500 UNIT(S): 5000 INJECTION INTRAVENOUS; SUBCUTANEOUS at 22:05

## 2020-01-24 RX ADMIN — Medication 30 MILLIGRAM(S): at 08:56

## 2020-01-24 RX ADMIN — HEPARIN SODIUM 7500 UNIT(S): 5000 INJECTION INTRAVENOUS; SUBCUTANEOUS at 05:15

## 2020-01-24 RX ADMIN — Medication 30 MILLIGRAM(S): at 22:07

## 2020-01-24 RX ADMIN — Medication 400 MILLIGRAM(S): at 18:47

## 2020-01-24 RX ADMIN — Medication 30 MILLIGRAM(S): at 08:40

## 2020-01-24 RX ADMIN — Medication 1000 MILLIGRAM(S): at 19:15

## 2020-01-24 RX ADMIN — HEPARIN SODIUM 7500 UNIT(S): 5000 INJECTION INTRAVENOUS; SUBCUTANEOUS at 14:17

## 2020-01-24 RX ADMIN — Medication 25 MILLIGRAM(S): at 07:33

## 2020-01-24 RX ADMIN — Medication 30 MILLIGRAM(S): at 23:12

## 2020-01-24 RX ADMIN — ONDANSETRON 4 MILLIGRAM(S): 8 TABLET, FILM COATED ORAL at 09:49

## 2020-01-24 RX ADMIN — Medication 25 MILLIGRAM(S): at 00:54

## 2020-01-24 RX ADMIN — ONDANSETRON 4 MILLIGRAM(S): 8 TABLET, FILM COATED ORAL at 15:08

## 2020-01-24 RX ADMIN — Medication 30 MILLIGRAM(S): at 15:14

## 2020-01-24 RX ADMIN — SODIUM CHLORIDE 100 MILLILITER(S): 9 INJECTION, SOLUTION INTRAVENOUS at 21:54

## 2020-01-24 RX ADMIN — Medication 30 MILLIGRAM(S): at 15:30

## 2020-01-24 RX ADMIN — HYDROMORPHONE HYDROCHLORIDE 30 MILLILITER(S): 2 INJECTION INTRAMUSCULAR; INTRAVENOUS; SUBCUTANEOUS at 11:30

## 2020-01-24 NOTE — PROGRESS NOTE ADULT - ASSESSMENT
44F with pmhx of asthma, depression and pshx of laparoscopic R ovarian torsion repair (1999), laparotomy repair for Rt. ruptured ectopic pregnancy (2002), b/l tubal ligation (2008) presents for elective ventral hernia repair on 1/22.     CLD/IVF  toradol, PCA  zofran prn  ice packs  SCDS/IS/OOBA/SQH  AM labs

## 2020-01-24 NOTE — PROGRESS NOTE ADULT - SUBJECTIVE AND OBJECTIVE BOX
INTERVAL HPI/OVERNIGHT EVENTS:   SURGERY ATTENDING    STATUS POST:  Exploratory Laparotomy, THEODORE, Repair of incisional / ventral hernia with myofascial flaps B/L and mesh, placement of PREVENA VAC     POST OPERATIVE DAY #: 2    SUBJECTIVE:  Flatus: [x ] YES [ ] NO             Bowel Movement: [ ] YES [x ] NO  Pain (0-10):       3     Pain Control Adequate: [x ] YES [ ] NO  Nausea: [ ] YES [x ] NO            Vomiting: [ ] YES [x ] NO  Diarrhea: [ ] YES [x ] NO         Constipation: [ ] YES [x ] NO     Chest Pain: [ ] YES [x ] NO    SOB:  [ ] YES [x ] NO    MEDICATIONS  (STANDING):  acetaminophen  IVPB .. 1000 milliGRAM(s) IV Intermittent once  BUpivacaine liposome 1.3% Injectable (no eMAR) 20 milliLiter(s) Local Injection once  heparin  Injectable 7500 Unit(s) SubCutaneous every 8 hours  lactated ringers. 1000 milliLiter(s) (100 mL/Hr) IV Continuous <Continuous>    MEDICATIONS  (PRN):  ALBUTerol    90 MICROgram(s) HFA Inhaler 2 Puff(s) Inhalation every 6 hours PRN Shortness of Breath and/or Wheezing  ketorolac   Injectable 30 milliGRAM(s) IV Push every 6 hours PRN Moderate Pain (4 - 6)  naloxone Injectable 0.1 milliGRAM(s) IV Push every 3 minutes PRN For ANY of the following changes in patient status:  A. RR LESS THAN 10 breaths per minute, B. Oxygen saturation LESS THAN 90%, C. Sedation score of 6  ondansetron Injectable 4 milliGRAM(s) IV Push every 6 hours PRN Nausea  sodium chloride 0.65% Nasal 1 Spray(s) Both Nostrils every 4 hours PRN Nasal Congestion      Vital Signs Last 24 Hrs  T(C): 36.7 (24 Jan 2020 13:40), Max: 37.1 (24 Jan 2020 00:35)  T(F): 98.1 (24 Jan 2020 13:40), Max: 98.8 (24 Jan 2020 00:35)  HR: 71 (24 Jan 2020 13:40) (67 - 85)  BP: 135/90 (24 Jan 2020 13:40) (110/60 - 135/90)  BP(mean): --  RR: 16 (24 Jan 2020 13:40) (15 - 18)  SpO2: 97% (24 Jan 2020 13:40) (94% - 98%)    PHYSICAL EXAM:      Constitutional:    Eyes:    ENMT:    Neck:    Breasts:    Back:    Respiratory:    Cardiovascular:    Gastrointestinal:    Genitourinary:    Rectal:    Extremities:    Vascular:    Neurological:    Skin:    Lymph Nodes:    Musculoskeletal:    Psychiatric:        I&O's Detail    23 Jan 2020 07:01  -  24 Jan 2020 07:00  --------------------------------------------------------  IN:    lactated ringers.: 2400 mL    Oral Fluid: 1080 mL  Total IN: 3480 mL    OUT:    Voided: 1150 mL  Total OUT: 1150 mL    Total NET: 2330 mL      24 Jan 2020 07:01  -  24 Jan 2020 16:09  --------------------------------------------------------  IN:    lactated ringers.: 700 mL    Oral Fluid: 600 mL  Total IN: 1300 mL    OUT:    Voided: 400 mL  Total OUT: 400 mL    Total NET: 900 mL          LABS:                        12.0   9.20  )-----------( 317      ( 24 Jan 2020 07:12 )             38.1     01-24    141  |  104  |  10  ----------------------------<  94  4.8   |  28  |  1.00    Ca    9.4      24 Jan 2020 07:12  Phos  3.7     01-24  Mg     2.0     01-24            RADIOLOGY & ADDITIONAL STUDIES:

## 2020-01-24 NOTE — PROGRESS NOTE ADULT - SUBJECTIVE AND OBJECTIVE BOX
24 hr events:  O/N: +F, _BM, itching after PCA, benadryl, andrea CLD, no dysuria  1/23: +F/-BM, pain controlled, OOBA. Adv and andrea CLD. dc'd paola, passed TOV. started on SQH.     SUBJECTIVE:  POD2 VHR  Pt seen and examined by chief resident. Pt is doing well, resting comfortably on bed. Pain controlled. Diet tolerated. Ambulating out of bed. +F/-BM. No nausea or vomiting. No complaints at this time.    Vital Signs Last 24 Hrs  T(C): 36.8 (24 Jan 2020 05:43), Max: 37.1 (24 Jan 2020 00:35)  T(F): 98.3 (24 Jan 2020 05:43), Max: 98.8 (24 Jan 2020 00:35)  HR: 67 (24 Jan 2020 05:43) (64 - 85)  BP: 122/84 (24 Jan 2020 05:43) (110/60 - 135/85)  RR: 17 (24 Jan 2020 05:43) (15 - 18)  SpO2: 96% (24 Jan 2020 05:43) (94% - 99%)    Physical Exam:  General: NAD  Pulmonary: Nonlabored breathing, no respiratory distress  Abdominal: soft, nontender, nondistended, prevena vac in place and hodling suction, abdominal binder in place.   Extremities: WWP, SCDs in place.     I&O's Summary    23 Jan 2020 07:01  -  24 Jan 2020 07:00  --------------------------------------------------------  IN: 3480 mL / OUT: 1150 mL / NET: 2330 mL        LABS:                        12.0   9.20  )-----------( 317      ( 24 Jan 2020 07:12 )             38.1     01-24    141  |  104  |  10  ----------------------------<  94  4.8   |  28  |  1.00    Ca    9.4      24 Jan 2020 07:12  Phos  3.7     01-24  Mg     2.0     01-24

## 2020-01-25 ENCOUNTER — TRANSCRIPTION ENCOUNTER (OUTPATIENT)
Age: 45
End: 2020-01-25

## 2020-01-25 VITALS
RESPIRATION RATE: 17 BRPM | HEART RATE: 72 BPM | DIASTOLIC BLOOD PRESSURE: 80 MMHG | TEMPERATURE: 98 F | SYSTOLIC BLOOD PRESSURE: 120 MMHG | OXYGEN SATURATION: 99 %

## 2020-01-25 LAB
ANION GAP SERPL CALC-SCNC: 10 MMOL/L — SIGNIFICANT CHANGE UP (ref 5–17)
BUN SERPL-MCNC: 7 MG/DL — SIGNIFICANT CHANGE UP (ref 7–23)
CALCIUM SERPL-MCNC: 9.2 MG/DL — SIGNIFICANT CHANGE UP (ref 8.4–10.5)
CHLORIDE SERPL-SCNC: 105 MMOL/L — SIGNIFICANT CHANGE UP (ref 96–108)
CO2 SERPL-SCNC: 26 MMOL/L — SIGNIFICANT CHANGE UP (ref 22–31)
CREAT SERPL-MCNC: 0.87 MG/DL — SIGNIFICANT CHANGE UP (ref 0.5–1.3)
GLUCOSE SERPL-MCNC: 108 MG/DL — HIGH (ref 70–99)
HCT VFR BLD CALC: 36.6 % — SIGNIFICANT CHANGE UP (ref 34.5–45)
HGB BLD-MCNC: 11.3 G/DL — LOW (ref 11.5–15.5)
MAGNESIUM SERPL-MCNC: 2 MG/DL — SIGNIFICANT CHANGE UP (ref 1.6–2.6)
MCHC RBC-ENTMCNC: 28 PG — SIGNIFICANT CHANGE UP (ref 27–34)
MCHC RBC-ENTMCNC: 30.9 GM/DL — LOW (ref 32–36)
MCV RBC AUTO: 90.8 FL — SIGNIFICANT CHANGE UP (ref 80–100)
NRBC # BLD: 0 /100 WBCS — SIGNIFICANT CHANGE UP (ref 0–0)
PHOSPHATE SERPL-MCNC: 3.9 MG/DL — SIGNIFICANT CHANGE UP (ref 2.5–4.5)
PLATELET # BLD AUTO: 298 K/UL — SIGNIFICANT CHANGE UP (ref 150–400)
POTASSIUM SERPL-MCNC: 3.8 MMOL/L — SIGNIFICANT CHANGE UP (ref 3.5–5.3)
POTASSIUM SERPL-SCNC: 3.8 MMOL/L — SIGNIFICANT CHANGE UP (ref 3.5–5.3)
RBC # BLD: 4.03 M/UL — SIGNIFICANT CHANGE UP (ref 3.8–5.2)
RBC # FLD: 12.3 % — SIGNIFICANT CHANGE UP (ref 10.3–14.5)
SODIUM SERPL-SCNC: 141 MMOL/L — SIGNIFICANT CHANGE UP (ref 135–145)
WBC # BLD: 6.59 K/UL — SIGNIFICANT CHANGE UP (ref 3.8–10.5)
WBC # FLD AUTO: 6.59 K/UL — SIGNIFICANT CHANGE UP (ref 3.8–10.5)

## 2020-01-25 RX ORDER — SENNA PLUS 8.6 MG/1
2 TABLET ORAL DAILY
Refills: 0 | Status: DISCONTINUED | OUTPATIENT
Start: 2020-01-25 | End: 2020-01-25

## 2020-01-25 RX ORDER — DOCUSATE SODIUM 100 MG
1 CAPSULE ORAL
Qty: 15 | Refills: 0
Start: 2020-01-25 | End: 2020-01-29

## 2020-01-25 RX ORDER — POTASSIUM CHLORIDE 20 MEQ
20 PACKET (EA) ORAL ONCE
Refills: 0 | Status: COMPLETED | OUTPATIENT
Start: 2020-01-25 | End: 2020-01-25

## 2020-01-25 RX ADMIN — Medication 30 MILLIGRAM(S): at 12:03

## 2020-01-25 RX ADMIN — Medication 20 MILLIEQUIVALENT(S): at 09:37

## 2020-01-25 RX ADMIN — Medication 30 MILLIGRAM(S): at 05:08

## 2020-01-25 RX ADMIN — SENNA PLUS 2 TABLET(S): 8.6 TABLET ORAL at 11:32

## 2020-01-25 RX ADMIN — HEPARIN SODIUM 7500 UNIT(S): 5000 INJECTION INTRAVENOUS; SUBCUTANEOUS at 13:34

## 2020-01-25 RX ADMIN — Medication 30 MILLIGRAM(S): at 04:09

## 2020-01-25 RX ADMIN — Medication 30 MILLIGRAM(S): at 11:32

## 2020-01-25 RX ADMIN — HEPARIN SODIUM 7500 UNIT(S): 5000 INJECTION INTRAVENOUS; SUBCUTANEOUS at 05:12

## 2020-01-25 NOTE — DISCHARGE NOTE NURSING/CASE MANAGEMENT/SOCIAL WORK - PATIENT PORTAL LINK FT
You can access the FollowMyHealth Patient Portal offered by [facility] by registering at the following website: [email or web address]. By joining [facility]’s FollowMyHealth portal, you will also be able to view your health information using other applications (apps) compatible with our system.

## 2020-01-25 NOTE — DISCHARGE NOTE PROVIDER - NSDCFUADDINST_GEN_ALL_CORE_FT
Please keep ice on incisions as much as possible.    Drink at least 2L of water every day.    For pain control take two extra strength tylenol and 400mg ibuprofen, together, every 4 hours, for three days.    For any temperatures > 101, worsening of pain, chest pain, shortness of breath, leg pain, nausea or vomiting, bleeding and/or drainage of your incisions, please call the provided number or visit your nearest emergency room. Please keep ice on incisions as much as possible.    Drink at least 2L of water every day.    For pain control take two extra strength tylenol and 400mg ibuprofen, together, every 4 hours, for three days.    1. Wash your hands with soap and warm water for at least 15 seconds.   2. Set your supplies on your workspace.  - Non sterile gloves  - Gauze pads if needed to clean or dry the wound  - Foam sponge dressing  - Clean scissors to cut drape and foam to size  3. Put on non-sterile gloves. Remove the old dressing.  4. Gently clean your wound with soap and water. If your doctor ordered cleaning solution, use that to clean your wound.  5. Check your wounds for signs of infection.  6. Open the new foam sponge dressing. Cut it to size. Place it in the wound.  7. Connect the tubing to the sponge dressing.  8. Connect the tubing to the pump unit.  9. Open the drape package. Cut the drape to the size needed.  10. Place the drape over the wound site. Smooth the drape as you stick it around the wound to prevent any wrinkle that may leak.  11. Turn on the VAC pump. Listen and watch for leaks. Use pieces from what you cut off the drape to seal any leaks around the edges of the drape or the tubing.     For any temperatures > 101, worsening of pain, chest pain, shortness of breath, leg pain, nausea or vomiting, bleeding and/or drainage of your incisions, please call the provided number or visit your nearest emergency room. Please keep ice on incisions as much as possible.    Drink at least 2L of water every day.    For pain control take two extra strength tylenol and 400mg ibuprofen, together, every 4 hours, for three days.    Please keep Sheree VAC dry until your appointment. Battery will last one week. Thanks.     For any temperatures > 101, worsening of pain, chest pain, shortness of breath, leg pain, nausea or vomiting, bleeding and/or drainage of your incisions, please call the provided number or visit your nearest emergency room.

## 2020-01-25 NOTE — PROGRESS NOTE ADULT - ASSESSMENT
44 F POD 3 ventral hernia repair c/b emesis 2/2 PCA.  No longer having N/V, now passing flatus  Likely advance diet  Attempt non-narcotic pain control.

## 2020-01-25 NOTE — PROGRESS NOTE ADULT - SUBJECTIVE AND OBJECTIVE BOX
STATUS POST:  Ventral hernia repair with mesh    Patient seen and examined at bedside. In no acute distress. Denies N/V. Is tolerating diet, having flatus and ambulating.     OBJECTIVE:    Vital Signs Last 24 Hrs  T(C): 36.3 (25 Jan 2020 08:09), Max: 37.2 (24 Jan 2020 21:00)  T(F): 97.4 (25 Jan 2020 08:09), Max: 98.9 (24 Jan 2020 21:00)  HR: 71 (25 Jan 2020 08:09) (62 - 71)  BP: 120/77 (25 Jan 2020 08:09) (120/77 - 135/90)  BP(mean): --  RR: 17 (25 Jan 2020 08:09) (16 - 18)  SpO2: 98% (25 Jan 2020 08:09) (97% - 99%)    I&O's Summary    24 Jan 2020 07:01  -  25 Jan 2020 07:00  --------------------------------------------------------  IN: 3060 mL / OUT: 3500 mL / NET: -440 mL    25 Jan 2020 07:01  -  25 Jan 2020 08:21  --------------------------------------------------------  IN: 100 mL / OUT: 0 mL / NET: 100 mL        Physical Exam:  General Appearance: Appears well, NAD  HEENT: Grossly symmetric  Chest: Non labored breathing  CV: Pulse regular presently  Abdomen: Soft, nontender, nondistended, dressings clean and dry and intact  Extremities: Grossly symmetric, no swelling, warm.     LABS:                        11.3   6.59  )-----------( 298      ( 25 Jan 2020 07:43 )             36.6     01-25    141  |  105  |  7   ----------------------------<  108<H>  3.8   |  26  |  0.87    Ca    9.2      25 Jan 2020 07:43  Phos  3.9     01-25  Mg     2.0     01-25            RADIOLOGY & ADDITIONAL STUDIES:

## 2020-01-25 NOTE — DISCHARGE NOTE PROVIDER - NSDCMRMEDTOKEN_GEN_ALL_CORE_FT
Duavee 0.45 mg-20 mg oral tablet: 1 tab(s) orally once a day  escitalopram 10 mg oral tablet: 1 tab(s) orally once a day  terbutaline:   vitamin D-calcium (as carbonate) 1000 intl units (25 mcg)-90 mg oral tablet: 1 tab(s) orally once a day Colace 100 mg oral capsule: 1 cap(s) orally 1 to 3 times a day   Duavee 0.45 mg-20 mg oral tablet: 1 tab(s) orally once a day  escitalopram 10 mg oral tablet: 1 tab(s) orally once a day  Percocet 5/325 oral tablet: 1 tab(s) orally every 6 hours MDD:4  terbutaline:   vitamin D-calcium (as carbonate) 1000 intl units (25 mcg)-90 mg oral tablet: 1 tab(s) orally once a day

## 2020-01-25 NOTE — DISCHARGE NOTE PROVIDER - HOSPITAL COURSE
This is a 45 y/o F PMHx lap ovarian torsion repair, laporotomy repair for R ruptured ectopic pregnancy and tubal lugation '2008, presents for elective repair of ventral hernia. The post-operative course was uncomplicated, with advancement of diet. At the time of discharge the patient was stable, pain well controlled, tolerating their oral diet and ambulating without issues.

## 2020-01-25 NOTE — PROGRESS NOTE ADULT - REASON FOR ADMISSION
ventral hernia repair

## 2020-01-25 NOTE — PROGRESS NOTE ADULT - ATTENDING COMMENTS
Abdomen soft, BS+, Flatus+, BM-, tolerating clear liquid diet, PREVENA VAC in place.  DVT prophylaxis, Spirometry, OOB to ambulate, F/U LABS, VS.
Abdomen soft, BS+, Flatus+, BM-, tolerating clear liquid diet, PREVENA VAC in place.  DVT prophylaxis, Spirometry, OOB to ambulate, F/U LABS, VS.
Passing flatus.  Tolerating PO.  No nausea or vomiting.  Ambulating.  Pain is well controlled.  D/C home.   F/U with Dr. Bettencourt in 1 week.

## 2020-01-28 LAB — SURGICAL PATHOLOGY STUDY: SIGNIFICANT CHANGE UP

## 2020-01-30 DIAGNOSIS — K43.2 INCISIONAL HERNIA WITHOUT OBSTRUCTION OR GANGRENE: ICD-10-CM

## 2020-01-30 DIAGNOSIS — J45.909 UNSPECIFIED ASTHMA, UNCOMPLICATED: ICD-10-CM

## 2020-01-30 DIAGNOSIS — F32.9 MAJOR DEPRESSIVE DISORDER, SINGLE EPISODE, UNSPECIFIED: ICD-10-CM

## 2020-01-30 PROCEDURE — 80048 BASIC METABOLIC PNL TOTAL CA: CPT

## 2020-01-30 PROCEDURE — 84100 ASSAY OF PHOSPHORUS: CPT

## 2020-01-30 PROCEDURE — 94640 AIRWAY INHALATION TREATMENT: CPT

## 2020-01-30 PROCEDURE — 85027 COMPLETE CBC AUTOMATED: CPT

## 2020-01-30 PROCEDURE — 83735 ASSAY OF MAGNESIUM: CPT

## 2020-01-30 PROCEDURE — 36415 COLL VENOUS BLD VENIPUNCTURE: CPT

## 2020-01-30 PROCEDURE — C1781: CPT

## 2020-01-30 PROCEDURE — 88302 TISSUE EXAM BY PATHOLOGIST: CPT

## 2020-04-09 PROBLEM — M25.559 HIP PAIN: Status: ACTIVE | Noted: 2019-09-04

## 2021-01-12 NOTE — DISCHARGE NOTE PROVIDER - CARE PROVIDER_API CALL
Tressa Bettencourt (MD)  Surgery  [address], [address]  [place] [address]  Phone: [phone]  Fax: [phone]  Follow Up Time: 1 week (1) More than 48 hours/None

## 2024-05-14 NOTE — DISCHARGE NOTE PROVIDER - NSDCACTIVITY_GEN_ALL_CORE
Walking - Indoors allowed/Return to Work/School allowed/Stairs allowed/Walking - Outdoors allowed/No heavy lifting/straining/Showering allowed/Driving allowed/Sex allowed Yes

## 2025-07-16 NOTE — DISCHARGE NOTE NURSING/CASE MANAGEMENT/SOCIAL WORK - NSTOBACCONEVERSMOKERY/N_GEN_A
[facility] call to Dr. Canales - no hydration needed at this time, maintain PICC line x 2-3 weeks. Next follow up appt 7/25.    Forwarded in book to 7/31 to check PICC status  
Yes